# Patient Record
Sex: FEMALE | Race: WHITE | Employment: FULL TIME | URBAN - METROPOLITAN AREA
[De-identification: names, ages, dates, MRNs, and addresses within clinical notes are randomized per-mention and may not be internally consistent; named-entity substitution may affect disease eponyms.]

---

## 2023-08-28 ENCOUNTER — HOSPITAL ENCOUNTER (EMERGENCY)
Facility: HOSPITAL | Age: 33
Discharge: HOME | End: 2023-08-28
Attending: STUDENT IN AN ORGANIZED HEALTH CARE EDUCATION/TRAINING PROGRAM

## 2023-08-28 VITALS
HEIGHT: 66 IN | DIASTOLIC BLOOD PRESSURE: 72 MMHG | OXYGEN SATURATION: 99 % | HEART RATE: 106 BPM | TEMPERATURE: 98.4 F | RESPIRATION RATE: 15 BRPM | SYSTOLIC BLOOD PRESSURE: 163 MMHG

## 2023-08-28 DIAGNOSIS — R00.0 TACHYCARDIA: ICD-10-CM

## 2023-08-28 DIAGNOSIS — E05.90 HYPERTHYROIDISM: Primary | ICD-10-CM

## 2023-08-28 LAB
ALBUMIN SERPL-MCNC: 3.9 G/DL (ref 3.5–5)
ALBUMIN/GLOB SERPL: 1.1 (ref 1.1–2.2)
ALP SERPL-CCNC: 90 U/L (ref 45–117)
ALT SERPL-CCNC: 56 U/L (ref 12–78)
ANION GAP SERPL CALC-SCNC: 7 MMOL/L (ref 5–15)
AST SERPL-CCNC: 21 U/L (ref 15–37)
BASOPHILS # BLD: 0 K/UL (ref 0–0.1)
BASOPHILS NFR BLD: 0 % (ref 0–1)
BILIRUB SERPL-MCNC: 0.6 MG/DL (ref 0.2–1)
BUN SERPL-MCNC: 8 MG/DL (ref 6–20)
BUN/CREAT SERPL: 16 (ref 12–20)
CALCIUM SERPL-MCNC: 10 MG/DL (ref 8.5–10.1)
CHLORIDE SERPL-SCNC: 108 MMOL/L (ref 97–108)
CO2 SERPL-SCNC: 23 MMOL/L (ref 21–32)
CREAT SERPL-MCNC: 0.51 MG/DL (ref 0.55–1.02)
D DIMER PPP FEU-MCNC: 0.39 MG/L FEU (ref 0–0.65)
DIFFERENTIAL METHOD BLD: ABNORMAL
EKG ATRIAL RATE: 102 BPM
EKG DIAGNOSIS: NORMAL
EKG P AXIS: 65 DEGREES
EKG P-R INTERVAL: 126 MS
EKG Q-T INTERVAL: 318 MS
EKG QRS DURATION: 80 MS
EKG QTC CALCULATION (BAZETT): 414 MS
EKG R AXIS: 90 DEGREES
EKG T AXIS: 61 DEGREES
EKG VENTRICULAR RATE: 102 BPM
EOSINOPHIL # BLD: 0.5 K/UL (ref 0–0.4)
EOSINOPHIL NFR BLD: 6 % (ref 0–7)
ERYTHROCYTE [DISTWIDTH] IN BLOOD BY AUTOMATED COUNT: 12.6 % (ref 11.5–14.5)
GLOBULIN SER CALC-MCNC: 3.7 G/DL (ref 2–4)
GLUCOSE SERPL-MCNC: 90 MG/DL (ref 65–100)
HCG UR QL: NEGATIVE
HCG, URINE, POC: NEGATIVE
HCT VFR BLD AUTO: 41.1 % (ref 35–47)
HGB BLD-MCNC: 13.3 G/DL (ref 11.5–16)
IMM GRANULOCYTES # BLD AUTO: 0 K/UL (ref 0–0.04)
IMM GRANULOCYTES NFR BLD AUTO: 0 % (ref 0–0.5)
LYMPHOCYTES # BLD: 2.3 K/UL (ref 0.8–3.5)
LYMPHOCYTES NFR BLD: 24 % (ref 12–49)
Lab: NORMAL
MCH RBC QN AUTO: 26.8 PG (ref 26–34)
MCHC RBC AUTO-ENTMCNC: 32.4 G/DL (ref 30–36.5)
MCV RBC AUTO: 82.9 FL (ref 80–99)
MONOCYTES # BLD: 0.8 K/UL (ref 0–1)
MONOCYTES NFR BLD: 8 % (ref 5–13)
NEGATIVE QC PASS/FAIL: NORMAL
NEUTS SEG # BLD: 5.8 K/UL (ref 1.8–8)
NEUTS SEG NFR BLD: 62 % (ref 32–75)
NRBC # BLD: 0 K/UL (ref 0–0.01)
NRBC BLD-RTO: 0 PER 100 WBC
PLATELET # BLD AUTO: 279 K/UL (ref 150–400)
PMV BLD AUTO: 9.6 FL (ref 8.9–12.9)
POSITIVE QC PASS/FAIL: NORMAL
POTASSIUM SERPL-SCNC: 3.6 MMOL/L (ref 3.5–5.1)
PROT SERPL-MCNC: 7.6 G/DL (ref 6.4–8.2)
RBC # BLD AUTO: 4.96 M/UL (ref 3.8–5.2)
SODIUM SERPL-SCNC: 138 MMOL/L (ref 136–145)
T4 FREE SERPL-MCNC: 4.7 NG/DL (ref 0.8–1.5)
TROPONIN I SERPL HS-MCNC: 4 NG/L (ref 0–51)
TSH SERPL DL<=0.05 MIU/L-ACNC: <0.01 UIU/ML (ref 0.36–3.74)
WBC # BLD AUTO: 9.4 K/UL (ref 3.6–11)

## 2023-08-28 PROCEDURE — 96360 HYDRATION IV INFUSION INIT: CPT

## 2023-08-28 PROCEDURE — 84443 ASSAY THYROID STIM HORMONE: CPT

## 2023-08-28 PROCEDURE — 80053 COMPREHEN METABOLIC PANEL: CPT

## 2023-08-28 PROCEDURE — 84484 ASSAY OF TROPONIN QUANT: CPT

## 2023-08-28 PROCEDURE — 85379 FIBRIN DEGRADATION QUANT: CPT

## 2023-08-28 PROCEDURE — 81025 URINE PREGNANCY TEST: CPT

## 2023-08-28 PROCEDURE — 99284 EMERGENCY DEPT VISIT MOD MDM: CPT

## 2023-08-28 PROCEDURE — 84439 ASSAY OF FREE THYROXINE: CPT

## 2023-08-28 PROCEDURE — 36415 COLL VENOUS BLD VENIPUNCTURE: CPT

## 2023-08-28 PROCEDURE — 93005 ELECTROCARDIOGRAM TRACING: CPT

## 2023-08-28 PROCEDURE — 2580000003 HC RX 258

## 2023-08-28 PROCEDURE — 6370000000 HC RX 637 (ALT 250 FOR IP): Performed by: STUDENT IN AN ORGANIZED HEALTH CARE EDUCATION/TRAINING PROGRAM

## 2023-08-28 PROCEDURE — 85025 COMPLETE CBC W/AUTO DIFF WBC: CPT

## 2023-08-28 RX ORDER — PROPRANOLOL HYDROCHLORIDE 40 MG/1
40 TABLET ORAL 3 TIMES DAILY
Qty: 90 TABLET | Refills: 0 | Status: SHIPPED | OUTPATIENT
Start: 2023-08-29 | End: 2023-09-28

## 2023-08-28 RX ORDER — PROPRANOLOL HYDROCHLORIDE 10 MG/1
40 TABLET ORAL 3 TIMES DAILY
Status: DISCONTINUED | OUTPATIENT
Start: 2023-08-28 | End: 2023-08-28 | Stop reason: HOSPADM

## 2023-08-28 RX ORDER — PROPRANOLOL HYDROCHLORIDE 40 MG/1
40 TABLET ORAL 3 TIMES DAILY
Qty: 90 TABLET | Refills: 0 | Status: SHIPPED | OUTPATIENT
Start: 2023-08-29 | End: 2023-08-28 | Stop reason: SDUPTHER

## 2023-08-28 RX ORDER — 0.9 % SODIUM CHLORIDE 0.9 %
1000 INTRAVENOUS SOLUTION INTRAVENOUS ONCE
Status: COMPLETED | OUTPATIENT
Start: 2023-08-28 | End: 2023-08-28

## 2023-08-28 RX ORDER — METHIMAZOLE 10 MG/1
20 TABLET ORAL DAILY
Qty: 60 TABLET | Refills: 0 | Status: SHIPPED | OUTPATIENT
Start: 2023-08-28 | End: 2023-08-28 | Stop reason: SDUPTHER

## 2023-08-28 RX ORDER — METHIMAZOLE 5 MG/1
20 TABLET ORAL DAILY
Status: DISCONTINUED | OUTPATIENT
Start: 2023-08-28 | End: 2023-08-28 | Stop reason: HOSPADM

## 2023-08-28 RX ORDER — METHIMAZOLE 10 MG/1
20 TABLET ORAL DAILY
Qty: 60 TABLET | Refills: 0 | Status: SHIPPED | OUTPATIENT
Start: 2023-08-28 | End: 2023-09-27

## 2023-08-28 RX ADMIN — METHIMAZOLE 20 MG: 5 TABLET ORAL at 21:25

## 2023-08-28 RX ADMIN — SODIUM CHLORIDE 1000 ML: 9 INJECTION, SOLUTION INTRAVENOUS at 16:56

## 2023-08-28 RX ADMIN — PROPRANOLOL HYDROCHLORIDE 40 MG: 10 TABLET ORAL at 20:13

## 2023-08-28 ASSESSMENT — ENCOUNTER SYMPTOMS
CONSTIPATION: 0
VOMITING: 0
NAUSEA: 1
SHORTNESS OF BREATH: 1
DIARRHEA: 0

## 2023-08-28 ASSESSMENT — PAIN - FUNCTIONAL ASSESSMENT: PAIN_FUNCTIONAL_ASSESSMENT: NONE - DENIES PAIN

## 2023-08-28 NOTE — ED TRIAGE NOTES
Patient arrives from home with signs and symptoms of hyperthyroidism. She had her blood work drawn at [facility] with elevated numbers.

## 2023-08-28 NOTE — ED PROVIDER NOTES
[facility] EMERGENCY [facility]      Pt Name: Pineda Ngo  MRN: [id number]  [address] [facility] 1990  Date of evaluation: 8/28/2023  Provider: Lisa Mcmullen PA-C    CHIEF COMPLAINT       Chief Complaint   Patient presents with    Tachycardia         HISTORY OF PRESENT ILLNESS   (Location/Symptom, Timing/Onset, Context/Setting, Quality, Duration, Modifying Factors, Severity)  Note limiting factors. HPI    Pineda Ngo is a 27 y.o. female with Hx of new diagnosis of hyperthyroidism who presents ambulatory to [facility] ED with cc of tachycardia. Patient notes she was recently diagnosed [facility] but they were unable to prescribe her definitive treatment. Notes pulse in the 120s, shakiness, nausea, dyspnea, weight loss, inability to sleep. Unable to get in with PCP or endocrinology for several months. Denies fever, chills, bowel or bladder changes, chest pain, any other concerns at this time. PCP: No primary care provider on file. There are no other complaints, changes or physical findings at this time. Review of External Medical Records:     Nursing Notes were reviewed. REVIEW OF SYSTEMS    (2-9 systems for level 4, 10 or more for level 5)     Review of Systems   Constitutional:  Positive for unexpected weight change. Negative for chills and fever. HENT:  Negative for congestion. Respiratory:  Positive for shortness of breath. Cardiovascular:  Positive for palpitations. Negative for chest pain. Gastrointestinal:  Positive for nausea. Negative for constipation, diarrhea and vomiting. Genitourinary:  Negative for dysuria and hematuria. Skin:  Negative for rash. Neurological:  Positive for tremors. Negative for dizziness, light-headedness and headaches. Psychiatric/Behavioral:  Positive for sleep disturbance. All other systems reviewed and are negative.     Except as noted above the remainder of the review of systems was reviewed

## 2023-08-29 ENCOUNTER — TELEPHONE (OUTPATIENT)
Age: 33
End: 2023-08-29

## 2023-08-29 DIAGNOSIS — E05.90 HYPERTHYROIDISM: Primary | ICD-10-CM

## 2023-08-29 NOTE — DISCHARGE INSTRUCTIONS
As discussed, you have hyperthyroidism but the remainder of your work-up is negative today. You are being prescribed 2 medications. Follow up with your PCP and endocrinologist for further management. Return to the ER if you experience severe or worsening symptoms.      Symptoms that are concerning for thyroid storm: Resting heart rate greater than 140, fever, behavioral changes, leg swelling

## 2023-08-29 NOTE — TELEPHONE ENCOUNTER
We had the following Watch[employer] exchange:    [employer]. Our office is located at:  [address] [place] [address] [address] [facility] ([facility]), 3rd floor, [address], [address] [place] [address] Ave  [phone] (phone)  [phone] (fax)    It appears the hospitalist wrote for a 30 day supply of methimazole and propranolol with no refills and these will likely run out a few days before this visit so when you are down to your last few tabs, let me know and I can approve another refill to last for a month. I put an order directly into the [employer] system to repeat your labs in the 3-4 days prior to your next visit so just ask for the order under my name and make a note on your calendar to have these done at that time. This order was also put directly into the [employer] portal.  Go under menu and my record and scroll down to upcoming tests and procedures and you are welcome to print this off or bring a copy of the order using the Watchwith garrick on your phone to the lab. Thanks!    ===View-only below this line===      ----- Message -----       From:Stefanie Cortez       Sent:8/29/2023  7:41 PM EDT         To:User Message Message List    Subject:sooner appointment? Hello! Thank you so much for reaching out! Yes, I will take that appointment. I really appreciate you getting me in sooner. Thank you!       ----- Message -----       From:Dr. Keagan Bartholomew       Sent:8/29/2023  7:00 PM EDT         To:Stefanie Cortez    Subject:sooner appointment? This is Dr. Edy Polk. I was contacted by the doctor you saw last night about a sooner visit for your hyperthyroidism. I checked my schedule and the soonest I can see you is on Tues 10/3/23 at 8:50 am in our [address] [place] [address] office. Please let me know if you can take this. Thanks so much!

## 2023-08-29 NOTE — CONSULTS
[address] [facility]  Hospitalist Group    Hospitalist Consult  Primary Care Provider: No primary care provider on file. Consult requested by: ED    Subjective:     Alecia Delarosa is a 27 y.o. female without significant past medical history who presented to ED from urgent care for concerns of hyperthyroidism. Patient states she has had weight loss, palpitations, malaise for the last few months. She states she has been evaluated before by different outpatient providers and referred to endocrinology for treatment of hypothyroidism. She states she has been unable to get an appointment and her next appointment is not till January 2024. She states she had mild palpitations today which prompted [facility].  [facility], she was referred to ED for evaluation. Medicine is consulted for possible management or admission of hypothyroidism. He/She denies any headaches or dizziness. Denies any cough, chest pain, shortness of breath. Denies fever or chills. Denies nausea, vomiting, diarrhea, constipation. Review of Systems:    Pertinent items are noted in the History of Present Illness. No past medical history on file. No past surgical history on file. Prior to Admission medications    Medication Sig Start Date End Date Taking? Authorizing Provider   propranolol (INDERAL) 40 MG tablet Take 1 tablet by mouth 3 times daily 8/29/23 9/28/23 Yes Khanh Murcia MD   methIMAzole (TAPAZOLE) 10 MG tablet Take 2 tablets by mouth daily 8/28/23 9/27/23 Yes Khanh Murcia MD     No Known Allergies   No family history on file. SOCIAL HISTORY:  Patient resides at Home. Patient ambulates with independently.    Smoking history: never  Alcohol history: NONE    Objective:       Physical Exam:   General appearance: alert, appears stated age, and cooperative  Neck: no adenopathy, no carotid bruit, no JVD, supple, symmetrical, trachea midline, and thyroid not enlarged, symmetric, no

## 2023-09-25 RX ORDER — METHIMAZOLE 10 MG/1
20 TABLET ORAL DAILY
Qty: 60 TABLET | Refills: 0 | Status: SHIPPED | OUTPATIENT
Start: 2023-09-25 | End: 2023-10-25

## 2023-09-25 RX ORDER — PROPRANOLOL HYDROCHLORIDE 40 MG/1
40 TABLET ORAL 3 TIMES DAILY
Qty: 90 TABLET | Refills: 0 | Status: SHIPPED | OUTPATIENT
Start: 2023-09-25 | End: 2023-10-25

## 2023-09-27 DIAGNOSIS — E05.90 HYPERTHYROIDISM: ICD-10-CM

## 2023-09-27 LAB
T3 SERPL-MCNC: 222 NG/DL (ref 71–180)
T4 FREE SERPL-MCNC: 2.23 NG/DL (ref 0.82–1.77)
THYROGLOB AB SERPL-ACNC: 21.3 IU/ML (ref 0–0.9)
THYROPEROXIDASE AB SERPL-ACNC: >600 IU/ML (ref 0–34)
TSH RECEP AB SER-ACNC: 9.15 IU/L (ref 0–1.75)
TSH SERPL DL<=0.005 MIU/L-ACNC: <0.005 UIU/ML (ref 0.45–4.5)

## 2023-10-03 ENCOUNTER — OFFICE VISIT (OUTPATIENT)
Age: 33
End: 2023-10-03

## 2023-10-03 VITALS
HEIGHT: 66 IN | SYSTOLIC BLOOD PRESSURE: 112 MMHG | BODY MASS INDEX: 17.81 KG/M2 | HEART RATE: 72 BPM | WEIGHT: 110.8 LBS | DIASTOLIC BLOOD PRESSURE: 80 MMHG

## 2023-10-03 DIAGNOSIS — R79.89 ELEVATED TESTOSTERONE LEVEL IN FEMALE: ICD-10-CM

## 2023-10-03 DIAGNOSIS — E05.00 GRAVES DISEASE: Primary | ICD-10-CM

## 2023-10-03 PROCEDURE — 99204 OFFICE O/P NEW MOD 45 MIN: CPT | Performed by: INTERNAL MEDICINE

## 2023-10-03 RX ORDER — METHIMAZOLE 10 MG/1
30 TABLET ORAL DAILY
Qty: 90 TABLET | Refills: 11 | Status: SHIPPED | OUTPATIENT
Start: 2023-10-03

## 2023-10-03 RX ORDER — PROPRANOLOL HYDROCHLORIDE 40 MG/1
40 TABLET ORAL 2 TIMES DAILY
Qty: 60 TABLET | Refills: 11 | Status: SHIPPED | OUTPATIENT
Start: 2023-10-03

## 2023-10-03 NOTE — PROGRESS NOTES
Chief Complaint   Patient presents with    New Patient     PCP and pharmacy confirmed     Thyroid Problem     History of Present Illness: Summer Figueroa is a 27 y.o. female who a new patient for thyroid. Her TSH was 0.87 and FT4 1.3 in 1/23. Did go to urgent care in [facility] for n/v in 4/23. Moved to [place] in 5/23. Presented to [facility] on 8/25/23 with nausea, vomiting, fatigue, and palpitations and shakes along with 20 lb unintentional wt loss since 4/23. Was given nausea meds and diagnosed with hyperthyroidism but no meds were given. Then went to [facility] on 8/28/23 and her TSH was < 0.01 and FT4 was 4.7 and was started on methimazole 20 mg daily and propranolol 40 mg tid. Has been good about taking these since discharge while waiting for this visit and has only missed a rare dose. N/V has been better. Her palpitations are improving but still can feel some internal shakes. Still feeling tired. Weight was down to 106 lbs at [facility] and up to 110 today. Bowels are regular but has been going twice daily when before was closer to once daily. Some hair loss that isn't any worse. No brittle nails. Does feel more sweats at night. Feet can feel cold. Does feel more anxiety and trouble sleeping with lucid dreams. No headaches. Periods are regular with no break through bleeding but they have been more dark brown. Sometimes feel her neck is more thick and has been avoiding wearing necklaces. Has had more mood swings and irritability and some lack of personality and doesn't feel as bubbly as normal.  Has had some muscle weakness and heat intolerance. Her hands and feet can sometimes turn more red. Does not currently have insurance. Did see a GYN in [place] in 1/23 and her total testosterone was 53 and free T was 5.2 (0.1-6.4) and LH/FSH 13.8/6.1 and estradiol was 63. Has noticed some hair growth on her chin that has worsened over the past year and does have pluck.   Has been told she had a cyst on her ovary in

## 2023-10-03 NOTE — PATIENT INSTRUCTIONS
1) Your TSH (thyroid test) is still undetectable but this takes longer to normalize. Your free T4 has improved from 4.7 to 2.2 but is still high so please increase your methimazole to 30 mg daily = 3 of the 10 mg tabs together once daily in the morning and I will updated the prescription on file so you don't run out. 2) Decrease the propranolol to 40 mg twice daily at breakfast and dinner. Over time, I hope to taper you off this as most patients don't need this once their thyroid is regulated. 3) Your TSH receptor antibody, which is a test for Grave's disease, is high at 9.15 and your 2 other antibodies, TPO and Thyroglobulin, are also elevated and we'll follow these over time to see how your immune system is responding to the medication. As long as they are elevated, we won't stop the methimazole and we'll decrease your dose over time to get to the lowest dose that keeps your levels normal.    4) Most patients need at least 18-24 months to normalize their levels and some patients need even longer up to 3-5 years or more before they can go into remission and the remission rate is in the 40-60% range for this condition. 5) If it doesn't look like you'll get insurance by the new year, then let me know as I can get you established in my free clinic called [facility] with [facility]. 6) Plan on repeating your labs in 6 weeks. I will send you a message through [employer] with your lab results. Based on your lab results, I'll determine when to check your labs next.   If Anne Ardon is going to charge you upfront again then instead, to to the [facility] lab next to my office in [place] 3rd floor [address]

## 2023-10-10 ENCOUNTER — TELEPHONE (OUTPATIENT)
Age: 33
End: 2023-10-10

## 2023-10-10 NOTE — TELEPHONE ENCOUNTER
Pt LVM stating she has back pain and wanted to know if she can take ibuprofen while on methimazole and propranolol.

## 2023-10-10 NOTE — TELEPHONE ENCOUNTER
You can let her know this is fine to take ibuprofen or tylenol as needed for any back pain she is having.

## 2023-10-23 RX ORDER — METHIMAZOLE 10 MG/1
30 TABLET ORAL DAILY
Qty: 270 TABLET | Refills: 3 | Status: SHIPPED | OUTPATIENT
Start: 2023-10-23

## 2023-10-23 RX ORDER — PROPRANOLOL HYDROCHLORIDE 40 MG/1
40 TABLET ORAL 2 TIMES DAILY
Qty: 180 TABLET | Refills: 3 | Status: SHIPPED | OUTPATIENT
Start: 2023-10-23

## 2023-11-14 DIAGNOSIS — E05.00 GRAVES DISEASE: ICD-10-CM

## 2023-11-14 LAB
T3 SERPL-MCNC: 60 NG/DL (ref 71–180)
T4 FREE SERPL-MCNC: 0.34 NG/DL (ref 0.82–1.77)
TSH SERPL DL<=0.005 MIU/L-ACNC: 6.35 UIU/ML (ref 0.45–4.5)

## 2023-12-28 DIAGNOSIS — E05.00 GRAVES DISEASE: ICD-10-CM

## 2024-01-19 LAB
T4 FREE SERPL-MCNC: 0.6 NG/DL (ref 0.8–1.5)
TSH SERPL DL<=0.05 MIU/L-ACNC: 7.33 UIU/ML (ref 0.36–3.74)

## 2024-02-20 DIAGNOSIS — E05.00 GRAVES DISEASE: ICD-10-CM

## 2024-02-21 LAB
T4 FREE SERPL-MCNC: 0.9 NG/DL (ref 0.8–1.5)
TSH SERPL DL<=0.05 MIU/L-ACNC: 2.75 UIU/ML (ref 0.36–3.74)

## 2024-05-15 LAB
T4 FREE SERPL-MCNC: 1.23 NG/DL (ref 0.82–1.77)
TSH RECEP AB SER-ACNC: <1.1 IU/L (ref 0–1.75)
TSH SERPL DL<=0.005 MIU/L-ACNC: 1.1 UIU/ML (ref 0.45–4.5)

## 2024-05-16 LAB — TESTOST SERPL-MCNC: 39.3 NG/DL (ref 10–55)

## 2024-05-18 LAB
THYROGLOB AB SERPL-ACNC: 16.5 IU/ML (ref 0–0.9)
THYROPEROXIDASE AB SERPL-ACNC: 266 IU/ML (ref 0–34)

## 2024-06-20 ENCOUNTER — TELEPHONE (OUTPATIENT)
Age: 34
End: 2024-06-20

## 2024-06-20 NOTE — TELEPHONE ENCOUNTER
Sent her the following message through [employer]:    I understand you called about the metformin.  Please tell me exactly what is going on and what symptoms you are experiencing and how much you are taking as I don't currently have a sooner visit than August unless someone cancels.     I will let you know that I will be out of the office starting Friday 6/21/24 at 4pm and won't be back until Monday 7/1/24 at 8:30am so if you write to me while I'm away, I will be intermittently checking messages when I have a chance and will respond to your message when I get a chance.  My partners will be covering for me while I'm away if something urgent is needed.

## 2024-06-20 NOTE — TELEPHONE ENCOUNTER
Pt LVM asking for a sooner appt. Pt states she needs to discuss metformin causing GI issues. Pt states she has rescheduled her appt but feels she can't wait until 08/30/2024.

## 2024-08-16 DIAGNOSIS — E05.00 GRAVES DISEASE: ICD-10-CM

## 2024-08-16 DIAGNOSIS — R79.89 ELEVATED TESTOSTERONE LEVEL IN FEMALE: ICD-10-CM

## 2024-08-23 LAB
BUN SERPL-MCNC: 9 MG/DL (ref 6–20)
BUN/CREAT SERPL: 13 (ref 9–23)
CALCIUM SERPL-MCNC: 9.9 MG/DL (ref 8.7–10.2)
CHLORIDE SERPL-SCNC: 103 MMOL/L (ref 96–106)
CO2 SERPL-SCNC: 23 MMOL/L (ref 20–29)
CREAT SERPL-MCNC: 0.67 MG/DL (ref 0.57–1)
EGFRCR SERPLBLD CKD-EPI 2021: 117 ML/MIN/1.73
GLUCOSE SERPL-MCNC: 79 MG/DL (ref 70–99)
POTASSIUM SERPL-SCNC: 4.3 MMOL/L (ref 3.5–5.2)
SODIUM SERPL-SCNC: 142 MMOL/L (ref 134–144)
T4 FREE SERPL-MCNC: 1.26 NG/DL (ref 0.82–1.77)
THYROGLOB AB SERPL-ACNC: 14.4 IU/ML (ref 0–0.9)
THYROPEROXIDASE AB SERPL-ACNC: 212 IU/ML (ref 0–34)
TSH RECEP AB SER-ACNC: <1.1 IU/L (ref 0–1.75)
TSH SERPL DL<=0.005 MIU/L-ACNC: 1.34 UIU/ML (ref 0.45–4.5)

## 2024-08-28 LAB — TESTOST SERPL-MCNC: 32 NG/DL (ref 10–55)

## 2024-08-30 ENCOUNTER — OFFICE VISIT (OUTPATIENT)
Age: 34
End: 2024-08-30
Payer: COMMERCIAL

## 2024-08-30 VITALS
HEIGHT: 66 IN | SYSTOLIC BLOOD PRESSURE: 111 MMHG | HEART RATE: 77 BPM | WEIGHT: 123 LBS | BODY MASS INDEX: 19.77 KG/M2 | DIASTOLIC BLOOD PRESSURE: 71 MMHG

## 2024-08-30 DIAGNOSIS — E28.2 PCOS (POLYCYSTIC OVARIAN SYNDROME): ICD-10-CM

## 2024-08-30 DIAGNOSIS — E05.00 GRAVES DISEASE: Primary | ICD-10-CM

## 2024-08-30 PROCEDURE — 99214 OFFICE O/P EST MOD 30 MIN: CPT | Performed by: INTERNAL MEDICINE

## 2024-08-30 RX ORDER — METHIMAZOLE 10 MG/1
TABLET ORAL
Qty: 270 TABLET | Refills: 3 | Status: SHIPPED | OUTPATIENT
Start: 2024-08-30

## 2024-08-30 NOTE — PROGRESS NOTES
Respiratory: clear to auscultation bilaterally   Integumentary: skin is normal, no edema  Neurological: reflexes 2+ at biceps, no tremors  Psychiatric: normal mood and affect    Data Reviewed:   Component      Latest Ref Rng 8/22/2024   Glucose      70 - 99 mg/dL 79    BUN,BUNPL      6 - 20 mg/dL 9    Creatinine      0.57 - 1.00 mg/dL 0.67    Est, Glom Filt Rate      >59 mL/min/1.73 117    Bun/Cre      9 - 23  13    Sodium      134 - 144 mmol/L 142    Potassium      3.5 - 5.2 mmol/L 4.3    Chloride      96 - 106 mmol/L 103    CARBON DIOXIDE      20 - 29 mmol/L 23    Calcium      8.7 - 10.2 mg/dL 9.9    Thyroid Peroxidase (TPO) Abs      0 - 34 IU/mL 212 (H)    Thyroglobulin Ab      0.0 - 0.9 IU/mL 14.4 (H)    Testosterone      10.0 - 55.0 ng/dL 32.0    Thyrotropin Receptor Ab      0.00 - 1.75 IU/L <1.10    T4 Free      0.82 - 1.77 ng/dL 1.26    TSH, 3rd Generation      0.450 - 4.500 uIU/mL 1.340           Assessment/Plan:     1. Graves disease: Presented to [facility] on 8/25/23 with nausea, vomiting, fatigue, and palpitations and shakes along with 20 lb unintentional wt loss since 4/23 and TSH <0.005, FT4 6.04 and FT3 22.1.  Was given nausea meds and diagnosed with hyperthyroidism but no meds were given. Then went to [facility] on 8/28/23 and her TSH was < 0.01 and FT4 was 4.7 and was started on methimazole 20 mg daily and propranolol 40 mg tid.  Labs on 9/26/23 prior to initial visit with me on 10/3/23 showed TSH < 0.005, FT4 2.23, T3 222, TRAb 9.15, TPO ab > 600 and TG ab 21.3 so I increased her dose to 30 mg daily and decreased her propranolol to 40 mg bid and TSH 6.35 in 11/23 so decreased to 20 mg daily and TSH 7.33 in 1/24 so decreased to 10 mg daily and TSH 2.75 in 2/24 so decreased to 10 mg 5x/week and TSH 1.11 and TRAb <1.1, TPO ab 266 and TG ab 16.5 in 5/24 and TSH 1.34, TRAb <1.1 and TPO ab 212 and TG ab 14.4 in 8/24 so kept dose the same.  I explained she will need meds for at least 18-24 months and  all normal.  Your glucose (blood sugar) is normal.    6) Please repeat your labs in 3 months.   I will send you a message through [employer] with your lab results.            Return 3/3/25 at 8:50am.     Copy sent to:  No primary care provider on file.

## 2024-08-30 NOTE — PATIENT INSTRUCTIONS
1) Your TSH (thyroid test) is perfect so I will keep your dose the same of methimazole 1 tab 5x/week.    2) Your TSH receptor antibody, which is a test for Grave's disease, is now undetectable for 2 straight times but your TPO and thyroglobulin ab levels are still high but are trending down.  As long as these are elevated, we won't stop the methimazole but will continue to taper over time.    3) Your testosterone is lower at 32 down from 39 but my goal is under 30.  You can try berberine 500 mg tabs (look in the supplement section of local pharmacy or [employer] or vitamin shop or buy online from [employer]) and take 1 tab with dinner or right after dinner.  This has properties similar to metformin and sometimes has less GI side effects than metformin.  If tolerating after 2 weeks, try increasing to 2 tabs with dinner or you can try splitting to 1 tab with breakfast and dinner.    4) BUN and creatinine are markers of kidney function.  Your values are normal.    5) Your electrolytes (sodium, potassium, and calcium) are all normal.  Your glucose (blood sugar) is normal.    6) Please repeat your labs in 3 months.   I will send you a message through [employer] with your lab results.

## 2024-10-08 RX ORDER — METHIMAZOLE 10 MG/1
TABLET ORAL
Qty: 65 TABLET | Refills: 3 | Status: SHIPPED | OUTPATIENT
Start: 2024-10-08

## 2024-11-30 DIAGNOSIS — E05.00 GRAVES DISEASE: ICD-10-CM

## 2024-11-30 DIAGNOSIS — E28.2 PCOS (POLYCYSTIC OVARIAN SYNDROME): ICD-10-CM

## 2025-02-18 ENCOUNTER — TELEPHONE (OUTPATIENT)
Age: 35
End: 2025-02-18

## 2025-02-18 NOTE — TELEPHONE ENCOUNTER
Pt LVM stating she hasn't had insurance that is why she didn't have her first set of labs drawn, but she will be having her labs drawn before her visit on 03/03/2025. She asked are the orders on file everything that Dr Lane needs as she wants everything drawn at once.

## 2025-02-18 NOTE — TELEPHONE ENCOUNTER
Yes let her know everything that is on file at [facility] and dated for February 2025 is what I need prior to her visit on 3/3/25.

## 2025-02-20 DIAGNOSIS — E28.2 PCOS (POLYCYSTIC OVARIAN SYNDROME): ICD-10-CM

## 2025-02-20 DIAGNOSIS — E05.00 GRAVES DISEASE: ICD-10-CM

## 2025-02-25 LAB
BUN SERPL-MCNC: 10 MG/DL (ref 6–20)
BUN/CREAT SERPL: 13 (ref 9–23)
CALCIUM SERPL-MCNC: 9.8 MG/DL (ref 8.7–10.2)
CHLORIDE SERPL-SCNC: 100 MMOL/L (ref 96–106)
CO2 SERPL-SCNC: 21 MMOL/L (ref 20–29)
CREAT SERPL-MCNC: 0.75 MG/DL (ref 0.57–1)
EGFRCR SERPLBLD CKD-EPI 2021: 106 ML/MIN/1.73
GLUCOSE SERPL-MCNC: 81 MG/DL (ref 70–99)
POTASSIUM SERPL-SCNC: 4.1 MMOL/L (ref 3.5–5.2)
SODIUM SERPL-SCNC: 137 MMOL/L (ref 134–144)
T4 FREE SERPL-MCNC: 1.28 NG/DL (ref 0.82–1.77)
TSH RECEP AB SER-ACNC: <1.1 IU/L (ref 0–1.75)
TSH SERPL DL<=0.005 MIU/L-ACNC: 1.43 UIU/ML (ref 0.45–4.5)

## 2025-02-26 LAB
THYROGLOB AB SERPL-ACNC: 28.5 IU/ML (ref 0–0.9)
THYROPEROXIDASE AB SERPL-ACNC: 253 IU/ML (ref 0–34)

## 2025-03-01 LAB — TESTOST SERPL-MCNC: 34 NG/DL (ref 10–55)
